# Patient Record
Sex: FEMALE | Race: OTHER | NOT HISPANIC OR LATINO
[De-identification: names, ages, dates, MRNs, and addresses within clinical notes are randomized per-mention and may not be internally consistent; named-entity substitution may affect disease eponyms.]

---

## 2023-10-19 PROBLEM — Z00.00 ENCOUNTER FOR PREVENTIVE HEALTH EXAMINATION: Status: ACTIVE | Noted: 2023-10-19

## 2023-10-21 ENCOUNTER — NON-APPOINTMENT (OUTPATIENT)
Age: 58
End: 2023-10-21

## 2023-11-01 ENCOUNTER — NON-APPOINTMENT (OUTPATIENT)
Age: 58
End: 2023-11-01

## 2023-11-03 ENCOUNTER — NON-APPOINTMENT (OUTPATIENT)
Age: 58
End: 2023-11-03

## 2023-11-06 ENCOUNTER — APPOINTMENT (OUTPATIENT)
Dept: BREAST CENTER | Facility: CLINIC | Age: 58
End: 2023-11-06
Payer: COMMERCIAL

## 2023-11-06 ENCOUNTER — NON-APPOINTMENT (OUTPATIENT)
Age: 58
End: 2023-11-06

## 2023-11-06 VITALS
BODY MASS INDEX: 26.8 KG/M2 | HEIGHT: 64 IN | DIASTOLIC BLOOD PRESSURE: 100 MMHG | HEART RATE: 106 BPM | WEIGHT: 157 LBS | SYSTOLIC BLOOD PRESSURE: 160 MMHG

## 2023-11-06 PROCEDURE — 36415 COLL VENOUS BLD VENIPUNCTURE: CPT

## 2023-11-06 PROCEDURE — 99205 OFFICE O/P NEW HI 60 MIN: CPT

## 2023-11-06 PROCEDURE — 93702 BIS XTRACELL FLUID ANALYSIS: CPT | Mod: NC

## 2023-11-06 RX ORDER — VALACYCLOVIR 500 MG/1
TABLET, FILM COATED ORAL
Refills: 0 | Status: ACTIVE | COMMUNITY

## 2023-11-07 ENCOUNTER — TRANSCRIPTION ENCOUNTER (OUTPATIENT)
Age: 58
End: 2023-11-07

## 2023-11-10 ENCOUNTER — TRANSCRIPTION ENCOUNTER (OUTPATIENT)
Age: 58
End: 2023-11-10

## 2023-11-13 ENCOUNTER — OUTPATIENT (OUTPATIENT)
Dept: OUTPATIENT SERVICES | Facility: HOSPITAL | Age: 58
LOS: 1 days | End: 2023-11-13
Payer: COMMERCIAL

## 2023-11-13 ENCOUNTER — RESULT REVIEW (OUTPATIENT)
Age: 58
End: 2023-11-13

## 2023-11-13 ENCOUNTER — TRANSCRIPTION ENCOUNTER (OUTPATIENT)
Age: 58
End: 2023-11-13

## 2023-11-13 DIAGNOSIS — C50.919 MALIGNANT NEOPLASM OF UNSPECIFIED SITE OF UNSPECIFIED FEMALE BREAST: ICD-10-CM

## 2023-11-13 LAB
SURGICAL PATHOLOGY STUDY: SIGNIFICANT CHANGE UP
SURGICAL PATHOLOGY STUDY: SIGNIFICANT CHANGE UP

## 2023-11-13 PROCEDURE — 88321 CONSLTJ&REPRT SLD PREP ELSWR: CPT

## 2023-11-15 ENCOUNTER — NON-APPOINTMENT (OUTPATIENT)
Age: 58
End: 2023-11-15

## 2023-11-27 ENCOUNTER — NON-APPOINTMENT (OUTPATIENT)
Age: 58
End: 2023-11-27

## 2023-12-12 ENCOUNTER — NON-APPOINTMENT (OUTPATIENT)
Age: 58
End: 2023-12-12

## 2023-12-18 ENCOUNTER — OUTPATIENT (OUTPATIENT)
Dept: OUTPATIENT SERVICES | Facility: HOSPITAL | Age: 58
LOS: 1 days | End: 2023-12-18
Payer: COMMERCIAL

## 2023-12-18 ENCOUNTER — RESULT REVIEW (OUTPATIENT)
Age: 58
End: 2023-12-18

## 2023-12-18 ENCOUNTER — APPOINTMENT (OUTPATIENT)
Dept: NUCLEAR MEDICINE | Facility: HOSPITAL | Age: 58
End: 2023-12-18

## 2023-12-18 PROCEDURE — 78195 LYMPH SYSTEM IMAGING: CPT | Mod: 26

## 2023-12-18 PROCEDURE — 78195 LYMPH SYSTEM IMAGING: CPT

## 2023-12-18 PROCEDURE — A9541: CPT

## 2023-12-19 ENCOUNTER — RESULT REVIEW (OUTPATIENT)
Age: 58
End: 2023-12-19

## 2023-12-19 ENCOUNTER — APPOINTMENT (OUTPATIENT)
Dept: BREAST CENTER | Facility: AMBULATORY SURGERY CENTER | Age: 58
End: 2023-12-19
Payer: COMMERCIAL

## 2023-12-19 PROCEDURE — 38525 BIOPSY/REMOVAL LYMPH NODES: CPT | Mod: LT

## 2023-12-19 PROCEDURE — 76098 X-RAY EXAM SURGICAL SPECIMEN: CPT | Mod: 26

## 2023-12-19 PROCEDURE — 19301 PARTIAL MASTECTOMY: CPT | Mod: LT

## 2023-12-28 ENCOUNTER — APPOINTMENT (OUTPATIENT)
Dept: HEMATOLOGY ONCOLOGY | Facility: CLINIC | Age: 58
End: 2023-12-28
Payer: COMMERCIAL

## 2023-12-28 ENCOUNTER — APPOINTMENT (OUTPATIENT)
Dept: BREAST CENTER | Facility: CLINIC | Age: 58
End: 2023-12-28
Payer: COMMERCIAL

## 2023-12-28 VITALS
BODY MASS INDEX: 25.61 KG/M2 | SYSTOLIC BLOOD PRESSURE: 127 MMHG | HEIGHT: 64 IN | DIASTOLIC BLOOD PRESSURE: 93 MMHG | HEART RATE: 92 BPM | WEIGHT: 150 LBS

## 2023-12-28 DIAGNOSIS — R92.8 OTHER ABNORMAL AND INCONCLUSIVE FINDINGS ON DIAGNOSTIC IMAGING OF BREAST: ICD-10-CM

## 2023-12-28 DIAGNOSIS — D24.1 BENIGN NEOPLASM OF RIGHT BREAST: ICD-10-CM

## 2023-12-28 PROCEDURE — 99024 POSTOP FOLLOW-UP VISIT: CPT

## 2023-12-28 PROCEDURE — 99203 OFFICE O/P NEW LOW 30 MIN: CPT

## 2023-12-28 NOTE — PHYSICAL EXAM
[Normocephalic] : normocephalic [EOMI] : extra ocular movement intact [Supple] : supple [No Supraclavicular Adenopathy] : no supraclavicular adenopathy [No Cervical Adenopathy] : no cervical adenopathy [de-identified] : *disregard breast image- unable to document correct incisons-breast image not working properly.  [de-identified] : R breast/axilla/supraclavicular area: No masses, discharge, or adenopathy [de-identified] : well healing surgical incisions

## 2023-12-28 NOTE — HISTORY OF PRESENT ILLNESS
[FreeTextEntry1] : Patient is a 59 yo F who presents today post-operative evaluation. S/P L nloc lumpectomy & SNB on 12/19/23 (Age 58). Final surgical pathology yielded 2.4 cm ILC, 0/4 LN negative for tumor, all final margins negative. L US bx proven ILC (ER+/NC+/HER2-) found on annual imaging as 0.5cm mass 11:00 4FN (was described as stable 0.3cm complex cyst 8/2022). She is an OLD/NEW with LOV in 2010. Hx of R benign US bx 6/28/13. She has hx of R excisional bx x2 in 12/29/2010 for FA 2:00 and FA w/ focal cellular stroma 4:00. Fhx of breast cancer in paternal aunt (age unknown). BRCA/full panel negative 2023. Patient denies palpable masses, skin changes, or nipple discharge bilaterally.  TNM Staging-pT2, pN0, pM0  8/16/21: B/l MG & US (LHR)- heterogeneously dense. R stable bx clip 12-1:00. R stable postsurgical change LIQ. US- R 0.6cm stable hypoechoic structure bx benign 12:00 7FN. BI-RADS 2 8/18/22: B/l MG & US- heterogeneously dense. R bx clip. R postsurgical scar LIQ. US- B/l multiple complex cysts. BI-RADS 2 9/8/23: B/l MG & US- heterogeneously dense. R bx clip 11-12:00. US- R 0.5cm bc mass 12:00 8FN. L 0.5cm hypoechoic mass w/ irregular margins 11:00 4FN (was 0.3cm in 2022- rec us bx). BI-RADS 4 10/13/23: L US bx 0.5cm mass 11:00 4FN (heart clip)- ILC, pleomorphic type. ER+ (>90%), [place]+ (>90%), HER2- (0 stain). Concordant- rec MRI 11/1/23: MRI- L 0.9cm irregular shaped mass w/ bx clip proven ILC 11:00. L 1cm clumped NME 2-3:00 (rec MR bx). R 0.9cm irregular mass 12-1:00 medial (rec MR bx). R 1.2cm region linear NME 12:00 anterior (rec MR bx). B/l no significant axillary or internal mammary lymphadenopathy. BI-RADS 4. 11/21/23: B/l MR bx x3 SITE 1) L NME 2-3:00 (hourglass clip)- Benign breast parenchyma w/ cystic apocrine metaplasia and UDH, microcalcs associated w/ epithelium. Benign & Concordant. SITE 2) R enhancing mass 12-1:00 (hourglass clip)- small focus LCIS (classic type), stromal fibrosis, UDH and apocrine metaplasia, microcalcs associated w/ LCIS and benign epithelium. High Risk & Concordant, there is a small focus, this may be an incidental lesion. Rec 6M f/u MRI if surgical excision is deferred. SITE 3) R NME 12:00 (buckle clip)- benign parenchyma w/ minute IDP, UDH and stromal fibrosis, microcalcs assoicated w/ epithelium. High risk due to presence of a papilloma; however papilloma is minute therefore rec 6M f/u. 12/19/23: L nloc lumpectomy & SNB- separate areas of ILC (pleomorphic type) ranging from microinvasive foci to largest present in 6 contiguous slices (24 mm), present at bx site changes HER2 negative (1+, 20%), LCIS, no lymphatic invasion identified, all final margins negative, 0/2 sentinel LN negative for tumor, 0/2 palpable LN negative for tumor.

## 2023-12-28 NOTE — PAST MEDICAL HISTORY
[Menarche Age ____] : age at menarche was [unfilled] [Menopause Age____] : age at menopause was [unfilled] [History of Hormone Replacement Treatment] : has a history of hormone replacement treatment [Total Preg ___] : G[unfilled] [Live Births ___] : P[unfilled]  [Age At Live Birth ___] : Age at live birth: [unfilled] [FreeTextEntry6] : No [FreeTextEntry7] : Yes [FreeTextEntry8] : Yes

## 2023-12-28 NOTE — HISTORY OF PRESENT ILLNESS
[FreeTextEntry1] : Patient is a 57yo F who presents today for initial evaluation of  L US bx proven ILC (ER+/SD+/HER2-) found on annual imaging as 0.5cm mass 11:00 4FN (was described as stable 0.3cm complex cyst 8/2022). She is an OLD/NEW with LOV in 2010. Hx of R benign US bx 6/28/13. She has hx of R excisional bx x2 in 12/29/2010 for FA 2:00 and FA w/ focal cellular stroma 4:00. Fhx of breast cancer in paternal aunt (age unknown). No genetic testing. Patient denies palpable masses, skin changes, or nipple discharge bilaterally.  8/16/21: B/l MG & US (LHR)- heterogeneously dense. R stable bx clip 12-1:00. R stable postsurgical change LIQ. US- R 0.6cm stable hypoechoic structure bx benign 12:00 7FN. BI-RADS 2 8/18/22: B/l MG & US- heterogeneously dense. R bx clip. R postsurgical scar LIQ. US- B/l multiple complex cysts. BI-RADS 2 9/8/23: B/l MG & US- heterogeneously dense. R bx clip 11-12:00. US- R 0.5cm bc mass 12:00 8FN. L 0.5cm hypoechoic mass w/ irregular margins 11:00 4FN (was 0.3cm in 2022- rec us bx). BI-RADS 4 10/13/23: L US bx 0.5cm mass 11:00 4FN (heart clip)- ILC, pleomorphic type. ER+ (>90%), SD+ (>90%), HER2- (0 stain). Concordant- rec MRI 11/1/23: MRI- L 0.9cm irregular shaped mass w/ bx clip proven ILC 11:00. L 1cm clumped NME 2-3:00 (rec MR bx). R 0.9cm irregular mass 12-1:00 medial (rec MR bx). R 1.2cm region linear NME 12:00 anterior (rec MR bx). B/l no significant axillary or internal mammary lymphadenopathy. BI-RADS 4.

## 2024-01-08 ENCOUNTER — TRANSCRIPTION ENCOUNTER (OUTPATIENT)
Age: 59
End: 2024-01-08

## 2024-01-12 ENCOUNTER — NON-APPOINTMENT (OUTPATIENT)
Age: 59
End: 2024-01-12

## 2024-01-16 ENCOUNTER — APPOINTMENT (OUTPATIENT)
Dept: RADIATION ONCOLOGY | Facility: CLINIC | Age: 59
End: 2024-01-16
Payer: COMMERCIAL

## 2024-01-16 VITALS
OXYGEN SATURATION: 98 % | HEART RATE: 79 BPM | DIASTOLIC BLOOD PRESSURE: 93 MMHG | RESPIRATION RATE: 16 BRPM | SYSTOLIC BLOOD PRESSURE: 157 MMHG | TEMPERATURE: 98 F

## 2024-01-16 PROCEDURE — 99204 OFFICE O/P NEW MOD 45 MIN: CPT

## 2024-01-16 NOTE — VITALS
[Maximal Pain Intensity: 0/10] : 0/10 [Least Pain Intensity: 0/10] : 0/10 [90: Able to carry normal activity; minor signs or symptoms of disease.] : 90: Able to carry normal activity; minor signs or symptoms of disease.  [90: Minor restrictions in physically strenous activity.] : 90: Minor restrictions in physically strenuous activity. [ECOG Performance Status: 1 - Restricted in physically strenuous activity but ambulatory and able to carry out work of a light or sedentary nature] : Performance Status: 1 - Restricted in physically strenuous activity but ambulatory and able to carry out work of a light or sedentary nature, e.g., light house work, office work [Date: ____________] : Patient's last distress assessment performed on [unfilled]. [3 - Distress Level] : Distress Level: 3

## 2024-01-16 NOTE — HISTORY OF PRESENT ILLNESS
[FreeTextEntry1] : Mouna Gandara is a 59yo F who presents today for consideration of radiation therapy in the management of  LEFT invasive lobular carcinoma (ER+/[place]+/HER2-) found on annual imaging as 0.5cm mass 11:00 4FN (was described as stable 0.3cm complex cyst 2022). Patient referred by Dr. Ailyn Salazar. Patient was seen by Dr. Obregon 23 with plan for endocrine therapy. She has hx of R excisional bx x2 in 2010 for FA 2:00 and FA w/ focal cellular stroma 4:00. Fhx of breast cancer in paternal aunt (age unknown).   HPI is as follows:  21: B/l MG & US (LHR)- heterogeneously dense. R stable bx clip 12-1:00. R stable postsurgical change LIQ. US- R 0.6cm stable hypoechoic structure bx benign 12:00 7FN. BI-RADS 2 22: B/l MG & US- heterogeneously dense. R bx clip. R postsurgical scar LIQ. US- B/l multiple complex cysts. BI-RADS 2 23: B/l MG & US- heterogeneously dense. R bx clip 11-12:00. US- R 0.5cm bc mass 12:00 8FN. L 0.5cm hypoechoic mass w/ irregular margins 11:00 4FN (was 0.3cm in - rec us bx). BI-RADS 4 10/13/23: L US bx 0.5cm mass 11:00 4FN (heart clip)- ILC, pleomorphic type. ER+ (>90%), [place]+ (>90%), HER2- (0 stain). Concordant- rec MRI 23: MRI- L 0.9cm irregular shaped mass w/ bx clip proven ILC 11:00. L 1cm clumped NME 2-3:00 (rec MR bx). R 0.9cm irregular mass 12-1:00 medial (rec MR bx). R 1.2cm region linear NME 12:00 anterior (rec MR bx). B/l no significant axillary or internal mammary lymphadenopathy. BI-RADS 4. 23: Pathology Synoptic Summary 1: Breast Invasive Carcinoma - Resection Specimen Procedure:  Excision (less than total mastectomy) Specimen Laterality:   Left Tumor Histologic Type:   Invasive lobular carcinoma Histologic Grade ([place] Histologic Score): Glandular (Acinar) / Tubular Differentiation:    Score 3 Nuclear Pleomorphism:   Score 3 Mitotic Rate:   Score 2 Overall Grade:   Grade 3 (scores of 8 or 9) Tumor Size:  24 Millimeters (mm) Ductal Carcinoma In Situ (DCIS):    Not identified Lymphatic and / or Vascular Invasion:    Not identified Treatment Effect in the Breast:   No known presurgical therapy Margins Margin Status for Invasive Carcinoma:    All margins negative for invasive carcinoma Distance from Invasive Carcinoma to Closest Margin:    Greater than 11 mm Regional Lymph Nodes Regional Lymph Node Status:   All regional lymph nodes negative for tumor Total Number of Lymph Nodes Examined (sentinel and non-sentinel): 8 Number of [place] Nodes Examined:    4 pTNM Classification (AJCC 8th Edition) pT Category:   pT2 T Suffix:  (m) pN Category:   pN0  Final Diagnosis 1  Left breast, lumpectomy: -   Separate areas of infiltrating lobular carcinoma, pleomorphic type, ranging from microinvasive foci to the largest present in 6 contiguous slices (24 mm), present at biopsy site changes.  See HER2 IHC report below -   Lobular carcinoma in situ. -   The closest margin is inferior and is 4 mm away (for final margin see part 5) E. -   No lymphatic invasion identified.  Oncotype RS 12   24 - RT Consult - Today patient presents for consultation accompanied by her daughter. Patient states she is feeling well overall but does note some stiffness to LUE, she is starting remote therapy to review exercises this thursday. 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    Prior RT - no  Prior Chemo - no Pacemaker - no

## 2024-02-06 ENCOUNTER — NON-APPOINTMENT (OUTPATIENT)
Age: 59
End: 2024-02-06

## 2024-02-06 NOTE — HISTORY OF PRESENT ILLNESS
[FreeTextEntry1] : Mouna Gandara is a 57yo F who presents today for consideration of radiation therapy in the management of  LEFT invasive lobular carcinoma (ER+/MA+/HER2-) found on annual imaging as 0.5cm mass 11:00 4FN (was described as stable 0.3cm complex cyst 2022). Patient referred by Dr. Ailyn Salazar. Patient was seen by Dr. Obregon 23 with plan for endocrine therapy. She has hx of R excisional bx x2 in 2010 for FA 2:00 and FA w/ focal cellular stroma 4:00. Fhx of breast cancer in paternal aunt (age unknown).   24 - OTV - Patient has completed 530/4240cGy of radiation to left breast. Patient states she is doing well overall. Denies pain, fatigue, skin irritation. Patient to start using mometasone. Continue radiation.   ----------------------------------- HPI is as follows:  21: B/l MG & US (R)- heterogeneously dense. R stable bx clip 12-1:00. R stable postsurgical change LIQ. US- R 0.6cm stable hypoechoic structure bx benign 12:00 7FN. BI-RADS 2 22: B/l MG & US- heterogeneously dense. R bx clip. R postsurgical scar LIQ. US- B/l multiple complex cysts. BI-RADS 2 23: B/l MG & US- heterogeneously dense. R bx clip 11-12:00. US- R 0.5cm bc mass 12:00 8FN. L 0.5cm hypoechoic mass w/ irregular margins 11:00 4FN (was 0.3cm in - rec us bx). BI-RADS 4 10/13/23: L US bx 0.5cm mass 11:00 4FN (heart clip)- ILC, pleomorphic type. ER+ (>90%), MA+ (>90%), HER2- (0 stain). Concordant- rec MRI 23: MRI- L 0.9cm irregular shaped mass w/ bx clip proven ILC 11:00. L 1cm clumped NME 2-3:00 (rec MR bx). R 0.9cm irregular mass 12-1:00 medial (rec MR bx). R 1.2cm region linear NME 12:00 anterior (rec MR bx). B/l no significant axillary or internal mammary lymphadenopathy. BI-RADS 4. 23: Pathology Synoptic Summary 1: Breast Invasive Carcinoma - Resection Specimen Procedure:  Excision (less than total mastectomy) Specimen Laterality:   Left Tumor Histologic Type:   Invasive lobular carcinoma Histologic Grade ([place] Histologic Score): Glandular (Acinar) / Tubular Differentiation:    Score 3 Nuclear Pleomorphism:   Score 3 Mitotic Rate:   Score 2 Overall Grade:   Grade 3 (scores of 8 or 9) Tumor Size:  24 Millimeters (mm) Ductal Carcinoma In Situ (DCIS):    Not identified Lymphatic and / or Vascular Invasion:    Not identified Treatment Effect in the Breast:   No known presurgical therapy Margins Margin Status for Invasive Carcinoma:    All margins negative for invasive carcinoma Distance from Invasive Carcinoma to Closest Margin:    Greater than 11 mm Regional Lymph Nodes Regional Lymph Node Status:   All regional lymph nodes negative for tumor Total Number of Lymph Nodes Examined (sentinel and non-sentinel): 8 Number of [place] Nodes Examined:    4 pTNM Classification (AJCC 8th Edition) pT Category:   pT2 T Suffix:  (m) pN Category:   pN0  Final Diagnosis 1  Left breast, lumpectomy: -   Separate areas of infiltrating lobular carcinoma, pleomorphic type, ranging from microinvasive foci to the largest present in 6 contiguous slices (24 mm), present at biopsy site changes.  See HER2 IHC report below -   Lobular carcinoma in situ. -   The closest margin is inferior and is 4 mm away (for final margin see part 5) E. -   No lymphatic invasion identified.  Oncotype RS 12   24 - RT Consult - Today patient presents for consultation accompanied by her daughter. Patient states she is feeling well overall but does note some stiffness to LUE, she is starting remote therapy to review exercises this thursday. 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    Prior RT - no  Prior Chemo - no Pacemaker - no

## 2024-02-06 NOTE — REVIEW OF SYSTEMS
[Nausea: Grade 0] : Nausea: Grade 0 [Fatigue: Grade 0] : Fatigue: Grade 0 [Breast Pain: Grade 0] : Breast Pain: Grade 0 [Anxiety: Grade 0] : Anxiety: Grade 0  [Depression: Grade 0] : Depression: Grade 0 [Insomnia: Grade 0] : Insomnia: Grade 0 [Dermatitis Radiation: Grade 0] : Dermatitis Radiation: Grade 0

## 2024-02-06 NOTE — DISEASE MANAGEMENT
[Pathological] : TNM Stage: p [TTNM] : 2 [NTNM] : 0 [MTNM] : x [IA] : IA [de-identified] : 045 [[place] [id number]] : [id number] [de-identified] : left breast

## 2024-02-12 ENCOUNTER — NON-APPOINTMENT (OUTPATIENT)
Age: 59
End: 2024-02-12

## 2024-02-13 ENCOUNTER — NON-APPOINTMENT (OUTPATIENT)
Age: 59
End: 2024-02-13

## 2024-02-13 VITALS
RESPIRATION RATE: 16 BRPM | HEART RATE: 72 BPM | OXYGEN SATURATION: 100 % | SYSTOLIC BLOOD PRESSURE: 130 MMHG | TEMPERATURE: 97.2 F | DIASTOLIC BLOOD PRESSURE: 90 MMHG

## 2024-02-13 NOTE — DISEASE MANAGEMENT
[Pathological] : TNM Stage: p [IA] : IA [TTNM] : 2 [NTNM] : 0 [MTNM] : x [de-identified] : [id number] [de-identified] : 0077 [de-identified] : left breast

## 2024-02-13 NOTE — HISTORY OF PRESENT ILLNESS
[FreeTextEntry1] : Mouna Gandara is a 57yo F who presents today for consideration of radiation therapy in the management of  LEFT invasive lobular carcinoma (ER+/IL+/HER2-) found on annual imaging as 0.5cm mass 11:00 4FN (was described as stable 0.3cm complex cyst 2022). Patient referred by Dr. Ailyn Salazar. Patient was seen by Dr. Obregon 23 with plan for endocrine therapy. She has hx of R excisional bx x2 in 2010 for FA 2:00 and FA w/ focal cellular stroma 4:00. Fhx of breast cancer in paternal aunt (age unknown).  24 - OTV - Patient has completed 1855/5240cGy of radiation to left Breast. Patient presents today with no complaints or concerns. She is tolerating radiation without incident. She feels well, travelling to and from radiation is tiring her (she has been in touch with  and is trying to get access a ride). Otherwise, she does not complain of fatigue. She is using mometasone and Aquaphor daily, her skin is intact with no irritation. She reports no pain, SOB/PIEDAD/CP/PALP. Continue radiation.  24 - OTV - Patient has completed 530/4240cGy of radiation to left breast. Patient states she is doing well overall. Denies pain, fatigue, skin irritation. Patient to start using mometasone. Continue radiation.   ----------------------------------- HPI is as follows:  21: B/l MG & US (LHR)- heterogeneously dense. R stable bx clip 12-1:00. R stable postsurgical change LIQ. US- R 0.6cm stable hypoechoic structure bx benign 12:00 7FN. BI-RADS 2 22: B/l MG & US- heterogeneously dense. R bx clip. R postsurgical scar LIQ. US- B/l multiple complex cysts. BI-RADS 2 23: B/l MG & US- heterogeneously dense. R bx clip 11-12:00. US- R 0.5cm bc mass 12:00 8FN. L 0.5cm hypoechoic mass w/ irregular margins 11:00 4FN (was 0.3cm in - rec us bx). BI-RADS 4 10/13/23: L US bx 0.5cm mass 11:00 4FN (heart clip)- ILC, pleomorphic type. ER+ (>90%), IL+ (>90%), HER2- (0 stain). Concordant- rec MRI 23: MRI- L 0.9cm irregular shaped mass w/ bx clip proven ILC 11:00. L 1cm clumped NME 2-3:00 (rec MR bx). R 0.9cm irregular mass 12-1:00 medial (rec MR bx). R 1.2cm region linear NME 12:00 anterior (rec MR bx). B/l no significant axillary or internal mammary lymphadenopathy. BI-RADS 4. 23: Pathology Synoptic Summary 1: Breast Invasive Carcinoma - Resection Specimen Procedure:  Excision (less than total mastectomy) Specimen Laterality:   Left Tumor Histologic Type:   Invasive lobular carcinoma Histologic Grade ([place] Histologic Score): Glandular (Acinar) / Tubular Differentiation:    Score 3 Nuclear Pleomorphism:   Score 3 Mitotic Rate:   Score 2 Overall Grade:   Grade 3 (scores of 8 or 9) Tumor Size:  24 Millimeters (mm) Ductal Carcinoma In Situ (DCIS):    Not identified Lymphatic and / or Vascular Invasion:    Not identified Treatment Effect in the Breast:   No known presurgical therapy Margins Margin Status for Invasive Carcinoma:    All margins negative for invasive carcinoma Distance from Invasive Carcinoma to Closest Margin:    Greater than 11 mm Regional Lymph Nodes Regional Lymph Node Status:   All regional lymph nodes negative for tumor Total Number of Lymph Nodes Examined (sentinel and non-sentinel): 8 Number of [place] Nodes Examined:    4 pTNM Classification (AJCC 8th Edition) pT Category:   pT2 T Suffix:  (m) pN Category:   pN0  Final Diagnosis 1  Left breast, lumpectomy: -   Separate areas of infiltrating lobular carcinoma, pleomorphic type, ranging from microinvasive foci to the largest present in 6 contiguous slices (24 mm), present at biopsy site changes.  See HER2 IHC report below -   Lobular carcinoma in situ. -   The closest margin is inferior and is 4 mm away (for final margin see part 5) E. -   No lymphatic invasion identified.  Oncotype RS 12   1/16/24 - RT Consult - Today patient presents for consultation accompanied by her daughter. Patient states she is feeling well overall but does note some stiffness to LUE, she is starting remote therapy to review exercises this thursday. 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    Prior RT - no  Prior Chemo - no Pacemaker - no

## 2024-02-13 NOTE — REVIEW OF SYSTEMS
[Fatigue: Grade 0] : Fatigue: Grade 0 [Breast Pain: Grade 0] : Breast Pain: Grade 0 [Skin Atrophy: Grade 0] : Skin Atrophy: Grade 0 [Skin Hyperpigmentation: Grade 1 - Hyperpigmentation covering <10% BSA; no psychosocial impact] : Skin Hyperpigmentation: Grade 1 - Hyperpigmentation covering <10% BSA; no psychosocial impact

## 2024-02-13 NOTE — REASON FOR VISIT
[Consideration of Curative Therapy] : consideration of curative therapy for [Breast Cancer] : breast cancer [Routine On-Treatment] : a routine on-treatment visit for

## 2024-02-20 ENCOUNTER — NON-APPOINTMENT (OUTPATIENT)
Age: 59
End: 2024-02-20

## 2024-02-20 NOTE — HISTORY OF PRESENT ILLNESS
[FreeTextEntry1] : Mouna Gandara is a 57yo F who presents today for consideration of radiation therapy in the management of  LEFT invasive lobular carcinoma (ER+/[place]+/HER2-) found on annual imaging as 0.5cm mass 11:00 4FN (was described as stable 0.3cm complex cyst 2022). Patient referred by Dr. Ailyn Salazar. Patient was seen by Dr. Obregon 23 with plan for endocrine therapy. She has hx of R excisional bx x2 in 2010 for FA 2:00 and FA w/ focal cellular stroma 4:00. Fhx of breast cancer in paternal aunt (age unknown).  24 - OTV - Patient has completed 2385cGy/5240cGy of radiation to left Breast. Patient presents today generally well with no complaints of fever, sob, chest pain, nipple discharge, dimpling or discoloration. Patient continues to adhere to skin regimen with mometasone and Aquaphor BID. Patient to continue RT.   24 - OTV - Patient has completed 1855/5240cGy of radiation to left Breast. Patient presents today with no complaints or concerns. She is tolerating radiation without incident. She feels well, travelling to and from radiation is tiring her (she has been in touch with  and is trying to get access a ride). Otherwise, she does not complain of fatigue. She is using mometasone and Aquaphor daily, her skin is intact with no irritation. She reports no pain, SOB/PIEDAD/CP/PALP. Continue radiation.  24 - OTV - Patient has completed 530/4240cGy of radiation to left breast. Patient states she is doing well overall. Denies pain, fatigue, skin irritation. Patient to start using mometasone. Continue radiation.   ----------------------------------- HPI is as follows:  21: B/l MG & US (LHR)- heterogeneously dense. R stable bx clip 12-1:00. R stable postsurgical change LIQ. US- R 0.6cm stable hypoechoic structure bx benign 12:00 7FN. BI-RADS 2 22: B/l MG & US- heterogeneously dense. R bx clip. R postsurgical scar LIQ. US- B/l multiple complex cysts. BI-RADS 2 23: B/l MG & US- heterogeneously dense. R bx clip 11-12:00. US- R 0.5cm bc mass 12:00 8FN. L 0.5cm hypoechoic mass w/ irregular margins 11:00 4FN (was 0.3cm in - rec us bx). BI-RADS 4 10/13/23: L US bx 0.5cm mass 11:00 4FN (heart clip)- ILC, pleomorphic type. ER+ (>90%), [place]+ (>90%), HER2- (0 stain). Concordant- rec MRI 23: MRI- L 0.9cm irregular shaped mass w/ bx clip proven ILC 11:00. L 1cm clumped NME 2-3:00 (rec MR bx). R 0.9cm irregular mass 12-1:00 medial (rec MR bx). R 1.2cm region linear NME 12:00 anterior (rec MR bx). B/l no significant axillary or internal mammary lymphadenopathy. BI-RADS 4. 23: Pathology Synoptic Summary 1: Breast Invasive Carcinoma - Resection Specimen Procedure:  Excision (less than total mastectomy) Specimen Laterality:   Left Tumor Histologic Type:   Invasive lobular carcinoma Histologic Grade ([place] Histologic Score): Glandular (Acinar) / Tubular Differentiation:    Score 3 Nuclear Pleomorphism:   Score 3 Mitotic Rate:   Score 2 Overall Grade:   Grade 3 (scores of 8 or 9) Tumor Size:  24 Millimeters (mm) Ductal Carcinoma In Situ (DCIS):    Not identified Lymphatic and / or Vascular Invasion:    Not identified Treatment Effect in the Breast:   No known presurgical therapy Margins Margin Status for Invasive Carcinoma:    All margins negative for invasive carcinoma Distance from Invasive Carcinoma to Closest Margin:    Greater than 11 mm Regional Lymph Nodes Regional Lymph Node Status:   All regional lymph nodes negative for tumor Total Number of Lymph Nodes Examined (sentinel and non-sentinel): 8 Number of [place] Nodes Examined:    4 pTNM Classification (AJCC 8th Edition) pT Category:   pT2 T Suffix:  (m) pN Category:   pN0  Final Diagnosis 1  Left breast, lumpectomy: -   Separate areas of infiltrating lobular carcinoma, pleomorphic type, ranging from microinvasive foci to the largest present in 6 contiguous slices (24 mm), present at biopsy site changes.  See HER2 IHC report below -   Lobular carcinoma in situ. -   The closest margin is inferior and is 4 mm away (for final margin see part 5) E. -   No lymphatic invasion identified.  Oncotype RS 12   24 - RT Consult - Today patient presents for consultation accompanied by her daughter. Patient states she is feeling well overall but does note some stiffness to LUE, she is starting remote therapy to review exercises this thursday. 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    Prior RT - no  Prior Chemo - no Pacemaker - no

## 2024-02-20 NOTE — DISEASE MANAGEMENT
[Pathological] : TNM Stage: p [IA] : IA [TTNM] : 2 [NTNM] : 0 [MTNM] : x [de-identified] : [id number] [de-identified] : 7912 [de-identified] : left breast

## 2024-02-27 ENCOUNTER — NON-APPOINTMENT (OUTPATIENT)
Age: 59
End: 2024-02-27

## 2024-02-27 VITALS
RESPIRATION RATE: 15 BRPM | HEART RATE: 86 BPM | SYSTOLIC BLOOD PRESSURE: 126 MMHG | TEMPERATURE: 97.3 F | OXYGEN SATURATION: 97 % | DIASTOLIC BLOOD PRESSURE: 89 MMHG

## 2024-02-27 NOTE — DISEASE MANAGEMENT
[Pathological] : TNM Stage: p [IA] : IA [TTNM] : 2 [MTNM] : x [NTNM] : 0 [de-identified] : [id number] [de-identified] : left breast [de-identified] : 3213

## 2024-02-27 NOTE — REVIEW OF SYSTEMS
[Constipation: Grade 0] : Constipation: Grade 0 [Diarrhea: Grade 0] : Diarrhea: Grade 0 [Nausea: Grade 0] : Nausea: Grade 0 [Vomiting: Grade 0] : Vomiting: Grade 0 [Anxiety: Grade 0] : Anxiety: Grade 0  [Fatigue: Grade 0] : Fatigue: Grade 0 [Cough: Grade 0] : Cough: Grade 0 [Depression: Grade 0] : Depression: Grade 0 [Dyspnea: Grade 0] : Dyspnea: Grade 0 [Skin Hyperpigmentation: Grade 1 - Hyperpigmentation covering <10% BSA; no psychosocial impact] : Skin Hyperpigmentation: Grade 1 - Hyperpigmentation covering <10% BSA; no psychosocial impact [Dermatitis Radiation: Grade 0] : Dermatitis Radiation: Grade 0 [FreeTextEntry4] : patient states slight hyperpigmentation on L breast.

## 2024-02-27 NOTE — HISTORY OF PRESENT ILLNESS
[FreeTextEntry1] : Mouna Gandara is a 57yo F who presents today for consideration of radiation therapy in the management of  LEFT invasive lobular carcinoma (ER+/GA+/HER2-) found on annual imaging as 0.5cm mass 11:00 4FN (was described as stable 0.3cm complex cyst 2022). Patient referred by Dr. Ailyn Salazar. Patient was seen by Dr. Obregon 23 with plan for endocrine therapy. She has hx of R excisional bx x2 in 2010 for FA 2:00 and FA w/ focal cellular stroma 4:00. Fhx of breast cancer in paternal aunt (age unknown).  24 - OTV - Patient has completed 3975cGy/5240cGy of radiation to left Breast. Patient presents today feeling well overall. She stated she did have some L shoulder pain which she took advil, tylenol and changed her bra to a softer looser sports bra which helped relieve the pain. Today she has no complaint of pain in the L side. She does notice some nipple sensitivity which is not too bothersome. Otherwise she has no complaint of SOB/PIEDAD, CP/Palp. Patient to continue RT   24 - OTV - Patient has completed 2385cGy/5240cGy of radiation to left Breast. Patient presents today generally well with no complaints of fever, sob, chest pain, nipple discharge, dimpling or discoloration. Patient continues to adhere to skin regimen with mometasone and Aquaphor BID. Patient to continue RT.   24 - OTV - Patient has completed 1855/5240cGy of radiation to left Breast. Patient presents today with no complaints or concerns. She is tolerating radiation without incident. She feels well, travelling to and from radiation is tiring her (she has been in touch with  and is trying to get access a ride). Otherwise, she does not complain of fatigue. She is using mometasone and Aquaphor daily, her skin is intact with no irritation. She reports no pain, SOB/PIEDAD/CP/PALP. Continue radiation.  24 - OTV - Patient has completed 530/4240cGy of radiation to left breast. Patient states she is doing well overall. Denies pain, fatigue, skin irritation. Patient to start using mometasone. Continue radiation.   ----------------------------------- HPI is as follows:  21: B/l MG & US (LHR)- heterogeneously dense. R stable bx clip 12-1:00. R stable postsurgical change LIQ. US- R 0.6cm stable hypoechoic structure bx benign 12:00 7FN. BI-RADS 2 22: B/l MG & US- heterogeneously dense. R bx clip. R postsurgical scar LIQ. US- B/l multiple complex cysts. BI-RADS 2 23: B/l MG & US- heterogeneously dense. R bx clip 11-12:00. US- R 0.5cm bc mass 12:00 8FN. L 0.5cm hypoechoic mass w/ irregular margins 11:00 4FN (was 0.3cm in - rec us bx). BI-RADS 4 10/13/23: L US bx 0.5cm mass 11:00 4FN (heart clip)- ILC, pleomorphic type. ER+ (>90%), GA+ (>90%), HER2- (0 stain). Concordant- rec MRI 23: MRI- L 0.9cm irregular shaped mass w/ bx clip proven ILC 11:00. L 1cm clumped NME 2-3:00 (rec MR bx). R 0.9cm irregular mass 12-1:00 medial (rec MR bx). R 1.2cm region linear NME 12:00 anterior (rec MR bx). B/l no significant axillary or internal mammary lymphadenopathy. BI-RADS 4. 23: Pathology Synoptic Summary 1: Breast Invasive Carcinoma - Resection Specimen Procedure:  Excision (less than total mastectomy) Specimen Laterality:   Left Tumor Histologic Type:   Invasive lobular carcinoma Histologic Grade ([place] Histologic Score): Glandular (Acinar) / Tubular Differentiation:    Score 3 Nuclear Pleomorphism:   Score 3 Mitotic Rate:   Score 2 Overall Grade:   Grade 3 (scores of 8 or 9) Tumor Size:  24 Millimeters (mm) Ductal Carcinoma In Situ (DCIS):    Not identified Lymphatic and / or Vascular Invasion:    Not identified Treatment Effect in the Breast:   No known presurgical therapy Margins Margin Status for Invasive Carcinoma:    All margins negative for invasive carcinoma Distance from Invasive Carcinoma to Closest Margin:    Greater than 11 mm Regional Lymph Nodes Regional Lymph Node Status:   All regional lymph nodes negative for tumor Total Number of Lymph Nodes Examined (sentinel and non-sentinel): 8 Number of [place] Nodes Examined:    4 pTNM Classification (AJCC 8th Edition) pT Category:   pT2 T Suffix:  (m) pN Category:   pN0  Final Diagnosis 1  Left breast, lumpectomy: -   Separate areas of infiltrating lobular carcinoma, pleomorphic type, ranging from microinvasive foci to the largest present in 6 contiguous slices (24 mm), present at biopsy site changes.  See HER2 IHC report below -   Lobular carcinoma in situ. -   The closest margin is inferior and is 4 mm away (for final margin see part 5) E. -   No lymphatic invasion identified.  Oncotype RS 12   24 - RT Consult - Today patient presents for consultation accompanied by her daughter. Patient states she is feeling well overall but does note some stiffness to LUE, she is starting remote therapy to review exercises this thursday. 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    Prior RT - no  Prior Chemo - no Pacemaker - no

## 2024-02-27 NOTE — VITALS
[Maximal Pain Intensity: 0/10] : 0/10 [Least Pain Intensity: 0/10] : 0/10 [100: Normal, no complaints, no evidence of disease.] : 100: Normal, no complaints, no evidence of disease. [NoTreatment Scheduled] : no treatment scheduled [ECOG Performance Status: 0 - Fully active, able to carry on all pre-disease performance without restriction] : Performance Status: 0 - Fully active, able to carry on all pre-disease performance without restriction

## 2024-02-28 ENCOUNTER — TRANSCRIPTION ENCOUNTER (OUTPATIENT)
Age: 59
End: 2024-02-28

## 2024-03-05 NOTE — REVIEW OF SYSTEMS
[Constipation: Grade 0] : Constipation: Grade 0 [Diarrhea: Grade 0] : Diarrhea: Grade 0 [Nausea: Grade 0] : Nausea: Grade 0 [Vomiting: Grade 0] : Vomiting: Grade 0 [Fatigue: Grade 1 - Fatigue relieved by rest] : Fatigue: Grade 1 - Fatigue relieved by rest [Skin Hyperpigmentation: Grade 0] : Skin Hyperpigmentation: Grade 0 [Dermatitis Radiation: Grade 0] : Dermatitis Radiation: Grade 0

## 2024-03-05 NOTE — DISEASE MANAGEMENT
[Pathological] : TNM Stage: p [TTNM] : 2 [NTNM] : 0 [MTNM] : x [IA] : IA [de-identified] : 2803 [de-identified] : 4804 [de-identified] : left breast

## 2024-03-05 NOTE — HISTORY OF PRESENT ILLNESS
[FreeTextEntry1] : Mouna Gandara is a 59yo F who presents today for consideration of radiation therapy in the management of LEFT invasive lobular carcinoma (ER+/HI+/HER2-) found on annual imaging as 0.5cm mass 11:00 4FN (was described as stable 0.3cm complex cyst 2022). Patient referred by Dr. Ailyn Salazar. Patient was seen by Dr. Obregon 23 with plan for endocrine therapy. She has hx of R excisional bx x2 in 2010 for FA 2:00 and FA w/ focal cellular stroma 4:00. Fhx of breast cancer in paternal aunt (age unknown).  3/5/2024 - OTV - Patient has completed 5240/5240cGy of radiation to the left breast. She comes today with no complaint of pain, darkness, itching or burning. She has no nausea or vomiting. Overall she is feeling well. She was given her post treatment patient education, has her PTE appointment for 2024. She will see Dr Obregon on 3/13/2024, and Dr Ailyn Salazar 2024.  24 - OTV - Patient has completed 3975cGy/5240cGy of radiation to left Breast. Patient presents today feeling well overall. She stated she did have some L shoulder pain which she took advil, tylenol and changed her bra to a softer looser sports bra which helped relieve the pain. Today she has no complaint of pain in the L side. She does notice some nipple sensitivity which is not too bothersome. Otherwise she has no complaint of SOB/PIEDAD, CP/Palp. Patient to continue RT   24 - OTV - Patient has completed 2385cGy/5240cGy of radiation to left Breast. Patient presents today generally well with no complaints of fever, sob, chest pain, nipple discharge, dimpling or discoloration. Patient continues to adhere to skin regimen with mometasone and Aquaphor BID. Patient to continue RT.   24 - OTV - Patient has completed 1855/5240cGy of radiation to left Breast. Patient presents today with no complaints or concerns. She is tolerating radiation without incident. She feels well, travelling to and from radiation is tiring her (she has been in touch with  and is trying to get access a ride). Otherwise, she does not complain of fatigue. She is using mometasone and Aquaphor daily, her skin is intact with no irritation. She reports no pain, SOB/PIEDAD/CP/PALP. Continue radiation.  24 - OTV - Patient has completed 530/4240cGy of radiation to left breast. Patient states she is doing well overall. Denies pain, fatigue, skin irritation. Patient to start using mometasone. Continue radiation.   ----------------------------------- HPI is as follows:  21: B/l MG & US (LHR)- heterogeneously dense. R stable bx clip 12-1:00. R stable postsurgical change LIQ. US- R 0.6cm stable hypoechoic structure bx benign 12:00 7FN. BI-RADS 2 22: B/l MG & US- heterogeneously dense. R bx clip. R postsurgical scar LIQ. US- B/l multiple complex cysts. BI-RADS 2 23: B/l MG & US- heterogeneously dense. R bx clip 11-12:00. US- R 0.5cm bc mass 12:00 8FN. L 0.5cm hypoechoic mass w/ irregular margins 11:00 4FN (was 0.3cm in - rec us bx). BI-RADS 4 10/13/23: L US bx 0.5cm mass 11:00 4FN (heart clip)- ILC, pleomorphic type. ER+ (>90%), HI+ (>90%), HER2- (0 stain). Concordant- rec MRI 23: MRI- L 0.9cm irregular shaped mass w/ bx clip proven ILC 11:00. L 1cm clumped NME 2-3:00 (rec MR bx). R 0.9cm irregular mass 12-1:00 medial (rec MR bx). R 1.2cm region linear NME 12:00 anterior (rec MR bx). B/l no significant axillary or internal mammary lymphadenopathy. BI-RADS 4. 23: Pathology Synoptic Summary 1: Breast Invasive Carcinoma - Resection Specimen Procedure:  Excision (less than total mastectomy) Specimen Laterality:   Left Tumor Histologic Type:   Invasive lobular carcinoma Histologic Grade ([place] Histologic Score): Glandular (Acinar) / Tubular Differentiation:    Score 3 Nuclear Pleomorphism:   Score 3 Mitotic Rate:   Score 2 Overall Grade:   Grade 3 (scores of 8 or 9) Tumor Size:  24 Millimeters (mm) Ductal Carcinoma In Situ (DCIS):    Not identified Lymphatic and / or Vascular Invasion:    Not identified Treatment Effect in the Breast:   No known presurgical therapy Margins Margin Status for Invasive Carcinoma:    All margins negative for invasive carcinoma Distance from Invasive Carcinoma to Closest Margin:    Greater than 11 mm Regional Lymph Nodes Regional Lymph Node Status:   All regional lymph nodes negative for tumor Total Number of Lymph Nodes Examined (sentinel and non-sentinel): 8 Number of [place] Nodes Examined:    4 pTNM Classification (AJCC 8th Edition) pT Category:   pT2 T Suffix:  (m) pN Category:   pN0  Final Diagnosis 1  Left breast, lumpectomy: -   Separate areas of infiltrating lobular carcinoma, pleomorphic type, ranging from microinvasive foci to the largest present in 6 contiguous slices (24 mm), present at biopsy site changes.  See HER2 IHC report below -   Lobular carcinoma in situ. -   The closest margin is inferior and is 4 mm away (for final margin see part 5) E. -   No lymphatic invasion identified.  Oncotype RS 12   24 - RT Consult - Today patient presents for consultation accompanied by her daughter. Patient states she is feeling well overall but does note some stiffness to LUE, she is starting remote therapy to review exercises this thursday. 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    Prior RT - no  Prior Chemo - no Pacemaker - no

## 2024-03-14 ENCOUNTER — APPOINTMENT (OUTPATIENT)
Dept: HEMATOLOGY ONCOLOGY | Facility: CLINIC | Age: 59
End: 2024-03-14
Payer: COMMERCIAL

## 2024-03-14 VITALS
RESPIRATION RATE: 18 BRPM | DIASTOLIC BLOOD PRESSURE: 90 MMHG | TEMPERATURE: 97 F | SYSTOLIC BLOOD PRESSURE: 133 MMHG | HEART RATE: 87 BPM | OXYGEN SATURATION: 98 % | WEIGHT: 147 LBS | BODY MASS INDEX: 27.75 KG/M2 | HEIGHT: 61 IN

## 2024-03-14 PROCEDURE — G2211 COMPLEX E/M VISIT ADD ON: CPT

## 2024-03-14 PROCEDURE — 99214 OFFICE O/P EST MOD 30 MIN: CPT

## 2024-03-14 NOTE — BEGINNING OF VISIT
[0] : 2) Feeling down, depressed, or hopeless: Not at all (0) [PHQ-2 Negative] : PHQ-2 Negative [de-identified] : 3/14/24 [[id number]] : 0

## 2024-03-14 NOTE — BEGINNING OF VISIT
[0] : 2) Feeling down, depressed, or hopeless: Not at all (0) [PHQ-2 Negative] : PHQ-2 Negative [de-identified] : 3/14/24 [[id number]] : 0

## 2024-03-14 NOTE — REVIEW OF SYSTEMS
[Diarrhea: Grade 0] : Diarrhea: Grade 0 [Hot Flashes] : hot flashes [Negative] : Allergic/Immunologic [de-identified] : RT skin changes, occasional left lateral breast/axilla pain and numbness

## 2024-03-14 NOTE — HISTORY OF PRESENT ILLNESS
[Disease: _____________________] : Disease: [unfilled] [T: ___] : T[unfilled] [N: ___] : N[unfilled] [M: ___] : M[unfilled] [AJCC Stage: ____] : AJCC Stage: [unfilled] [de-identified] : 57yo F with LEFT ILC (ER+/AL+/HER2-) s/p 12/19/23 left lumpectomy/SLNB and adjuvant RT (completed 3/5/24) returns today to discuss adjuvant endocrine therapy.  Fhx of breast cancer in paternal aunt (age unknown).  Hx of R benign US bx 6/28/13. She has hx of R excisional bx x2 in 12/29/2010 for FA 2:00 and FA w/ focal cellular stroma 4:00.  8/16/21: B/l MG & US (LHR)- heterogeneously dense. R stable bx clip 12-1:00. R stable postsurgical change LIQ. US- R 0.6cm stable hypoechoic structure bx benign 12:00 7FN. BI-RADS 2  8/18/22: B/l MG & US- heterogeneously dense. R bx clip. R postsurgical scar LIQ. US- B/l multiple complex cysts. BI-RADS 2  9/8/23: B/l MG & US- heterogeneously dense. R bx clip 11-12:00. US- R 0.5cm bc mass 12:00 8FN. L 0.5cm hypoechoic mass w/ irregular margins 11:00 4FN (was 0.3cm in 2022- rec us bx). BI-RADS 4  10/13/23: L US bx 0.5cm mass 11:00 4FN (heart clip)- ILC, pleomorphic type. ER+ (>90%), AL+ (>90%), HER2- (0 stain). Concordant  11/1/23: MRI- L 0.9cm irregular shaped mass w/ bx clip proven ILC 11:00. L 1cm clumped NME 2-3:00 (rec MR bx). R 0.9cm irregular mass 12-1:00 medial (rec MR bx). R 1.2cm region linear NME 12:00 anterior (rec MR bx). B/l no significant axillary or internal mammary lymphadenopathy. BI-RADS 4.  11/21/23: B/l MR bx x3 SITE 1) L NME 2-3:00 (hourglass clip)- Benign breast parenchyma w/ cystic apocrine metaplasia and UDH, microcalcs associated w/ epithelium. Benign & Concordant. SITE 2) R enhancing mass 12-1:00 (hourglass clip)- small focus LCIS (classic type), stromal fibrosis, UDH and apocrine metaplasia, microcalcs associated w/ LCIS and benign epithelium. High Risk & Concordant, there is a small focus, this may be an incidental lesion. Rec 6M f/u MRI if surgical excision is deferred. SITE 3) R NME 12:00 (buckle clip)- benign parenchyma w/ minute IDP, UDH and stromal fibrosis, microcalcs assoicated w/ epithelium. High risk due to presence of a papilloma; however papilloma is minute therefore rec 6M f/u.  12/19/23: L nloc lumpectomy & SNB- separate areas of ILC (pleomorphic type) ranging from microinvasive foci to largest present in 6 contiguous slices (24 mm), present at bx site changes HER2 negative (1+, 20%), LCIS, no lymphatic invasion identified, all final margins negative, 0/2 sentinel LN negative for tumor, 0/2 palpable LN negative for tumor.  2/14/24 DEXA - normal bone density  [de-identified] : poorly differentiated invasive lobular carcinoma [de-identified] : 11/2023 [facility] genetic testing negative [de-identified] : ER+ (>90%), MI+ (>90%), HER2- (0-1), Oncotype RS 12 [de-identified] : Tolerated RT well.  She has occasional 8/10 pain in the left lateral breast/axilla and numbness.  Uses emollients.  Has baseline hot flashes.  She has occasional joint pain in the hand and left hip when it's cold.

## 2024-03-14 NOTE — PHYSICAL EXAM
[de-identified] : s/p lumpectomy. No mass palpated b/l [Normal] : affect appropriate [de-identified] :  Normal range of active movement of the shoulder (180 for flexion and abduction).

## 2024-03-14 NOTE — HISTORY OF PRESENT ILLNESS
[Disease: _____________________] : Disease: [unfilled] [T: ___] : T[unfilled] [N: ___] : N[unfilled] [M: ___] : M[unfilled] [AJCC Stage: ____] : AJCC Stage: [unfilled] [de-identified] : poorly differentiated invasive lobular carcinoma [de-identified] : 57yo F with LEFT ILC (ER+/GA+/HER2-) s/p 12/19/23 left lumpectomy/SLNB and adjuvant RT (completed 3/5/24) returns today to discuss adjuvant endocrine therapy.  Fhx of breast cancer in paternal aunt (age unknown).  Hx of R benign US bx 6/28/13. She has hx of R excisional bx x2 in 12/29/2010 for FA 2:00 and FA w/ focal cellular stroma 4:00.  8/16/21: B/l MG & US (LHR)- heterogeneously dense. R stable bx clip 12-1:00. R stable postsurgical change LIQ. US- R 0.6cm stable hypoechoic structure bx benign 12:00 7FN. BI-RADS 2  8/18/22: B/l MG & US- heterogeneously dense. R bx clip. R postsurgical scar LIQ. US- B/l multiple complex cysts. BI-RADS 2  9/8/23: B/l MG & US- heterogeneously dense. R bx clip 11-12:00. US- R 0.5cm bc mass 12:00 8FN. L 0.5cm hypoechoic mass w/ irregular margins 11:00 4FN (was 0.3cm in 2022- rec us bx). BI-RADS 4  10/13/23: L US bx 0.5cm mass 11:00 4FN (heart clip)- ILC, pleomorphic type. ER+ (>90%), GA+ (>90%), HER2- (0 stain). Concordant  11/1/23: MRI- L 0.9cm irregular shaped mass w/ bx clip proven ILC 11:00. L 1cm clumped NME 2-3:00 (rec MR bx). R 0.9cm irregular mass 12-1:00 medial (rec MR bx). R 1.2cm region linear NME 12:00 anterior (rec MR bx). B/l no significant axillary or internal mammary lymphadenopathy. BI-RADS 4.  11/21/23: B/l MR bx x3 SITE 1) L NME 2-3:00 (hourglass clip)- Benign breast parenchyma w/ cystic apocrine metaplasia and UDH, microcalcs associated w/ epithelium. Benign & Concordant. SITE 2) R enhancing mass 12-1:00 (hourglass clip)- small focus LCIS (classic type), stromal fibrosis, UDH and apocrine metaplasia, microcalcs associated w/ LCIS and benign epithelium. High Risk & Concordant, there is a small focus, this may be an incidental lesion. Rec 6M f/u MRI if surgical excision is deferred. SITE 3) R NME 12:00 (buckle clip)- benign parenchyma w/ minute IDP, UDH and stromal fibrosis, microcalcs assoicated w/ epithelium. High risk due to presence of a papilloma; however papilloma is minute therefore rec 6M f/u.  12/19/23: L nloc lumpectomy & SNB- separate areas of ILC (pleomorphic type) ranging from microinvasive foci to largest present in 6 contiguous slices (24 mm), present at bx site changes HER2 negative (1+, 20%), LCIS, no lymphatic invasion identified, all final margins negative, 0/2 sentinel LN negative for tumor, 0/2 palpable LN negative for tumor.  2/14/24 DEXA - normal bone density  [de-identified] : 11/2023 [facility] genetic testing negative [de-identified] : ER+ (>90%), TX+ (>90%), HER2- (0-1), Oncotype RS 12 [de-identified] : Tolerated RT well.  She has occasional 8/10 pain in the left lateral breast/axilla and numbness.  Uses emollients.  Has baseline hot flashes.  She has occasional joint pain in the hand and left hip when it's cold.

## 2024-03-14 NOTE — REVIEW OF SYSTEMS
[Diarrhea: Grade 0] : Diarrhea: Grade 0 [Hot Flashes] : hot flashes [Negative] : Allergic/Immunologic [de-identified] : RT skin changes, occasional left lateral breast/axilla pain and numbness

## 2024-03-14 NOTE — PHYSICAL EXAM
[de-identified] : s/p lumpectomy. No mass palpated b/l [Normal] : affect appropriate [de-identified] :  Normal range of active movement of the shoulder (180 for flexion and abduction).

## 2024-05-07 ENCOUNTER — APPOINTMENT (OUTPATIENT)
Dept: BREAST CENTER | Facility: CLINIC | Age: 59
End: 2024-05-07

## 2024-05-09 ENCOUNTER — APPOINTMENT (OUTPATIENT)
Dept: RADIATION ONCOLOGY | Facility: CLINIC | Age: 59
End: 2024-05-09
Payer: COMMERCIAL

## 2024-05-09 VITALS
DIASTOLIC BLOOD PRESSURE: 87 MMHG | SYSTOLIC BLOOD PRESSURE: 127 MMHG | TEMPERATURE: 97.7 F | BODY MASS INDEX: 27.15 KG/M2 | WEIGHT: 143.7 LBS | HEART RATE: 82 BPM | RESPIRATION RATE: 16 BRPM | OXYGEN SATURATION: 99 %

## 2024-05-09 PROCEDURE — 99024 POSTOP FOLLOW-UP VISIT: CPT

## 2024-05-09 RX ORDER — MOMETASONE FUROATE 1 MG/G
0.1 OINTMENT TOPICAL TWICE DAILY
Qty: 1 | Refills: 1 | Status: COMPLETED | COMMUNITY
Start: 2024-01-16 | End: 2024-05-09

## 2024-05-09 NOTE — VITALS
[Maximal Pain Intensity: 0/10] : 0/10 [NoTreatment Scheduled] : no treatment scheduled [100: Normal, no complaints, no evidence of disease.] : 100: Normal, no complaints, no evidence of disease. [100: Fully active, normal.] : 100: Fully active, normal. [ECOG Performance Status: 0 - Fully active, able to carry on all pre-disease performance without restriction] : Performance Status: 0 - Fully active, able to carry on all pre-disease performance without restriction

## 2024-05-09 NOTE — PHYSICAL EXAM
[Normal] : oriented to person, place and time, the affect was normal, the mood was normal and not anxious [de-identified] : well healed lumpectomy incision and nipple incision

## 2024-05-09 NOTE — HISTORY OF PRESENT ILLNESS
[FreeTextEntry1] : Mouna Gandara is a 59 y/o F who completed 20/20F or 5240/5240cGy of radiation to the L breast on 3/5/2024 for LEFT breast ILC (ER+/AK+/HER2-). She had a normal bone density scan 2024. She was seen by Dr Obregon, Oncotype DX recurrence score 12, 3/14/2024 patient taking Anastrozole. She is to have MRI Breast Aug 2024. She will see Dr Tomlin 2024, Dr Obregon 2024.   2024 - PTE Today she comes without complaint, she states she is felling well. She denies fatigue, cp/palp, sob/kofi, f/c, pain. She endorses some numbness, tingling/sharp stabbing sensation occasionally, she does not take any meds - self resolves. Her skin is healed, still has some hyperpigmentation on Left medial breast. She states her issues with range of motion on her Left side has resolved. She is taking Anastrazole which she states she is having hot flashes and nausea.  __________________________________ ONCOLOGIC HISTORY: Mouna Gandara is a 57yo F who presents today for consideration of radiation therapy in the management of LEFT invasive lobular carcinoma (ER+/AK+/HER2-) found on annual imaging as 0.5cm mass 11:00 4FN (was described as stable 0.3cm complex cyst 2022). Patient referred by Dr. Ailyn Salazar. Patient was seen by Dr. Obregon 23 with plan for endocrine therapy. She has hx of R excisional bx x2 in 2010 for FA 2:00 and FA w/ focal cellular stroma 4:00. Fhx of breast cancer in paternal aunt (age unknown).  ----------------------------------- HPI is as follows:  21: B/l MG & US (LHR)- heterogeneously dense. R stable bx clip 12-1:00. R stable postsurgical change LIQ. US- R 0.6cm stable hypoechoic structure bx benign 12:00 7FN. BI-RADS 2 22: B/l MG & US- heterogeneously dense. R bx clip. R postsurgical scar LIQ. US- B/l multiple complex cysts. BI-RADS 2 23: B/l MG & US- heterogeneously dense. R bx clip 11-12:00. US- R 0.5cm bc mass 12:00 8FN. L 0.5cm hypoechoic mass w/ irregular margins 11:00 4FN (was 0.3cm in - rec us bx). BI-RADS 4 10/13/23: L US bx 0.5cm mass 11:00 4FN (heart clip)- ILC, pleomorphic type. ER+ (>90%), AK+ (>90%), HER2- (0 stain). Concordant- rec MRI 23: MRI- L 0.9cm irregular shaped mass w/ bx clip proven ILC 11:00. L 1cm clumped NME 2-3:00 (rec MR bx). R 0.9cm irregular mass 12-1:00 medial (rec MR bx). R 1.2cm region linear NME 12:00 anterior (rec MR bx). B/l no significant axillary or internal mammary lymphadenopathy. BI-RADS 4. 23: Pathology Synoptic Summary 1: Breast Invasive Carcinoma - Resection Specimen Procedure:  Excision (less than total mastectomy) Specimen Laterality:   Left Tumor Histologic Type:   Invasive lobular carcinoma Histologic Grade ([place] Histologic Score): Glandular (Acinar) / Tubular Differentiation:    Score 3 Nuclear Pleomorphism:   Score 3 Mitotic Rate:   Score 2 Overall Grade:   Grade 3 (scores of 8 or 9) Tumor Size:  24 Millimeters (mm) Ductal Carcinoma In Situ (DCIS):    Not identified Lymphatic and / or Vascular Invasion:    Not identified Treatment Effect in the Breast:   No known presurgical therapy Margins Margin Status for Invasive Carcinoma:    All margins negative for invasive carcinoma Distance from Invasive Carcinoma to Closest Margin:    Greater than 11 mm Regional Lymph Nodes Regional Lymph Node Status:   All regional lymph nodes negative for tumor Total Number of Lymph Nodes Examined (sentinel and non-sentinel): 8 Number of [place] Nodes Examined:    4 pTNM Classification (AJCC 8th Edition) pT Category:   pT2 T Suffix:  (m) pN Category:   pN0  Final Diagnosis 1  Left breast, lumpectomy: -   Separate areas of infiltrating lobular carcinoma, pleomorphic type, ranging from microinvasive foci to the largest present in 6 contiguous slices (24 mm), present at biopsy site changes.  See HER2 IHC report below -   Lobular carcinoma in situ. -   The closest margin is inferior and is 4 mm away (for final margin see part 5) E. -   No lymphatic invasion identified.  Oncotype RS 12   24 - RT Consult - Today patient presents for consultation accompanied by her daughter. Patient states she is feeling well overall but does note some stiffness to LUE, she is starting remote therapy to review exercises this thursday. 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    Prior RT - no  Prior Chemo - no Pacemaker - no

## 2024-05-16 ENCOUNTER — APPOINTMENT (OUTPATIENT)
Dept: BREAST CENTER | Facility: CLINIC | Age: 59
End: 2024-05-16
Payer: COMMERCIAL

## 2024-05-16 DIAGNOSIS — C50.919 MALIGNANT NEOPLASM OF UNSPECIFIED SITE OF UNSPECIFIED FEMALE BREAST: ICD-10-CM

## 2024-05-16 DIAGNOSIS — Z80.3 FAMILY HISTORY OF MALIGNANT NEOPLASM OF BREAST: ICD-10-CM

## 2024-05-16 DIAGNOSIS — Z08 ENCOUNTER FOR FOLLOW-UP EXAMINATION AFTER COMPLETED TREATMENT FOR MALIGNANT NEOPLASM: ICD-10-CM

## 2024-05-16 DIAGNOSIS — Z85.3 ENCOUNTER FOR FOLLOW-UP EXAMINATION AFTER COMPLETED TREATMENT FOR MALIGNANT NEOPLASM: ICD-10-CM

## 2024-05-16 PROCEDURE — 99213 OFFICE O/P EST LOW 20 MIN: CPT

## 2024-05-20 NOTE — PHYSICAL EXAM
[Normocephalic] : normocephalic [EOMI] : extra ocular movement intact [Supple] : supple [No Supraclavicular Adenopathy] : no supraclavicular adenopathy [No Cervical Adenopathy] : no cervical adenopathy [de-identified] : R breast/axilla/supraclavicular area: No masses, discharge, or adenopathy [de-identified] : L breast/axilla/supraclavicular area: No evidence of recurrence

## 2024-05-20 NOTE — HISTORY OF PRESENT ILLNESS
[FreeTextEntry1] : Patient is a 60yo F who presents today for breast cancer surveillance. S/P L nloc lumpectomy & SNB on 12/19/23 (age 58) yielding 2.4 cm ILC (ER+/NC+/HER2-), 0/4 LN, all final margins negative. Oncotype RS 12. S/p RT (completed 3/2024, Dr. Wernicke). Currently on Anastrozole (Dr. Obregon). She has hx of R excisional bx x2 in 12/29/2010 for FA 2:00 and FA w/ focal cellular stroma 4:00. Fhx of breast cancer in paternal aunt (age unknown). BRCA/full panel negative (tested 2023). Patient denies palpable masses, skin changes, or nipple discharge bilaterally.  TNM Staging-pT2, pN0, pM0  8/16/21: B/l MG & US ([facility])- heterogeneously dense. R stable bx clip 12-1:00. R stable postsurgical change LIQ. US- R 0.6cm stable hypoechoic structure bx benign 12:00 7FN. BI-RADS 2 8/18/22: B/l MG & US- heterogeneously dense. R bx clip. R postsurgical scar LIQ. US- B/l multiple complex cysts. BI-RADS 2 9/8/23: B/l MG & US- heterogeneously dense. R bx clip 11-12:00. US- R 0.5cm bc mass 12:00 8FN. L 0.5cm hypoechoic mass w/ irregular margins 11:00 4FN (was 0.3cm in 2022- rec us bx). BI-RADS 4 10/13/23: L US bx 0.5cm mass 11:00 4FN (heart clip)- ILC, pleomorphic type. ER+ (>90%), NC+ (>90%), HER2- (0 stain). Concordant- rec MRI 11/1/23: MRI- L 0.9cm irregular shaped mass w/ bx clip proven ILC 11:00. L 1cm clumped NME 2-3:00 (rec MR bx). R 0.9cm irregular mass 12-1:00 medial (rec MR bx). R 1.2cm region linear NME 12:00 anterior (rec MR bx). B/l no significant axillary or internal mammary lymphadenopathy. BI-RADS 4. 11/21/23: B/l MR bx x3 SITE 1) L NME 2-3:00 (hourglass clip)- Benign breast parenchyma w/ cystic apocrine metaplasia and UDH, microcalcs associated w/ epithelium. Benign & Concordant. SITE 2) R enhancing mass 12-1:00 (hourglass clip)- small focus LCIS (classic type), stromal fibrosis, UDH and apocrine metaplasia, microcalcs associated w/ LCIS and benign epithelium. High Risk & Concordant, there is a small focus, this may be an incidental lesion. Rec 6M f/u MRI if surgical excision is deferred. SITE 3) R NME 12:00 (buckle clip)- benign parenchyma w/ minute IDP, UDH and stromal fibrosis, microcalcs assoicated w/ epithelium. High risk due to presence of a papilloma; however papilloma is minute therefore rec 6M f/u. 12/19/23: L nloc lumpectomy & SNB- separate areas of ILC (pleomorphic type) ranging from microinvasive foci to largest present in 6 contiguous slices (24 mm), present at bx site changes HER2 negative (1+, 20%), LCIS, no lymphatic invasion identified, all final margins negative, 0/2 sentinel LN negative for tumor, 0/2 palpable LN negative for tumor.  5/16/24: MRI: heterogeneously dense. Stable R enhancing foci. L numerous enhancing foci with postlumpectomy changes and radiation changes. L 1.2cm 11:00 postop fat necrosis inferior to lumpx site. (rec 6 m f/u MRI). BI-RADS 3.

## 2024-05-29 ENCOUNTER — TRANSCRIPTION ENCOUNTER (OUTPATIENT)
Age: 59
End: 2024-05-29

## 2024-05-29 RX ORDER — ANASTROZOLE TABLETS 1 MG/1
1 TABLET ORAL DAILY
Qty: 90 | Refills: 0 | Status: ACTIVE | COMMUNITY
Start: 2024-03-14 | End: 1900-01-01

## 2024-05-30 ENCOUNTER — TRANSCRIPTION ENCOUNTER (OUTPATIENT)
Age: 59
End: 2024-05-30

## 2024-09-12 ENCOUNTER — APPOINTMENT (OUTPATIENT)
Dept: HEMATOLOGY ONCOLOGY | Facility: CLINIC | Age: 59
End: 2024-09-12
Payer: COMMERCIAL

## 2024-09-12 VITALS
SYSTOLIC BLOOD PRESSURE: 123 MMHG | DIASTOLIC BLOOD PRESSURE: 83 MMHG | HEIGHT: 61 IN | HEART RATE: 74 BPM | BODY MASS INDEX: 26.24 KG/M2 | WEIGHT: 139 LBS | OXYGEN SATURATION: 99 % | RESPIRATION RATE: 18 BRPM

## 2024-09-12 DIAGNOSIS — C50.919 MALIGNANT NEOPLASM OF UNSPECIFIED SITE OF UNSPECIFIED FEMALE BREAST: ICD-10-CM

## 2024-09-12 PROCEDURE — 99214 OFFICE O/P EST MOD 30 MIN: CPT

## 2024-09-13 RX ORDER — CALCIUM CITRATE/VITAMIN D3 315MG-6.25
TABLET ORAL
Refills: 0 | Status: ACTIVE | COMMUNITY

## 2024-09-13 NOTE — PHYSICAL EXAM
[Normal] : affect appropriate [de-identified] : s/p lumpectomy. No mass palpated in the breast or axillae [de-identified] :  Normal range of active movement of the shoulder (180 for flexion and abduction).

## 2024-09-13 NOTE — PHYSICAL EXAM
[Normal] : affect appropriate [de-identified] : s/p lumpectomy. No mass palpated in the breast or axillae [de-identified] :  Normal range of active movement of the shoulder (180 for flexion and abduction).

## 2024-09-13 NOTE — REVIEW OF SYSTEMS
[Hot Flashes] : hot flashes [Diarrhea: Grade 0] : Diarrhea: Grade 0 [Negative] : Allergic/Immunologic [Recent Change In Weight] : ~T recent weight change [FreeTextEntry7] : transient nausea, constipation relieved by prunes [de-identified] : RT skin changes, occasional left lateral breast/axilla pain and numbness

## 2024-09-13 NOTE — REVIEW OF SYSTEMS
[Hot Flashes] : hot flashes [Diarrhea: Grade 0] : Diarrhea: Grade 0 [Negative] : Allergic/Immunologic [Recent Change In Weight] : ~T recent weight change [FreeTextEntry7] : transient nausea, constipation relieved by prunes [de-identified] : RT skin changes, occasional left lateral breast/axilla pain and numbness

## 2024-09-13 NOTE — PHYSICAL EXAM
[Normal] : affect appropriate [de-identified] : s/p lumpectomy. No mass palpated in the breast or axillae [de-identified] :  Normal range of active movement of the shoulder (180 for flexion and abduction).

## 2024-09-13 NOTE — HISTORY OF PRESENT ILLNESS
[Disease: _____________________] : Disease: [unfilled] [T: ___] : T[unfilled] [N: ___] : N[unfilled] [M: ___] : M[unfilled] [AJCC Stage: ____] : AJCC Stage: [unfilled] [de-identified] : 58yo F with Stage IB T2N0Mx LEFT ILC (ER+/NV+/HER2-) s/p 12/19/23 left lumpectomy/SLNB, adjuvant RT (completed 3/5/24), and adjuvant anastrozole (3/2024-present) is here for f/u.  Fhx of breast cancer in paternal aunt (age unknown).  Hx of R benign US bx 6/28/13. She has hx of R excisional bx x2 in 12/29/2010 for FA 2:00 and FA w/ focal cellular stroma 4:00.  8/16/21: B/l MG & US ([facility])- heterogeneously dense. R stable bx clip 12-1:00. R stable postsurgical change LIQ. US- R 0.6cm stable hypoechoic structure bx benign 12:00 7FN. BI-RADS 2  8/18/22: B/l MG & US- heterogeneously dense. R bx clip. R postsurgical scar LIQ. US- B/l multiple complex cysts. BI-RADS 2  9/8/23: B/l MG & US- heterogeneously dense. R bx clip 11-12:00. US- R 0.5cm bc mass 12:00 8FN. L 0.5cm hypoechoic mass w/ irregular margins 11:00 4FN (was 0.3cm in 2022- rec us bx). BI-RADS 4  10/13/23: L US bx 0.5cm mass 11:00 4FN (heart clip)- ILC, pleomorphic type. ER+ (>90%), NV+ (>90%), HER2- (0 stain). Concordant  11/1/23: MRI- L 0.9cm irregular shaped mass w/ bx clip proven ILC 11:00. L 1cm clumped NME 2-3:00 (rec MR bx). R 0.9cm irregular mass 12-1:00 medial (rec MR bx). R 1.2cm region linear NME 12:00 anterior (rec MR bx). B/l no significant axillary or internal mammary lymphadenopathy. BI-RADS 4.  11/21/23: B/l MR bx x3 SITE 1) L NME 2-3:00 (hourglass clip)- Benign breast parenchyma w/ cystic apocrine metaplasia and UDH, microcalcs associated w/ epithelium. Benign & Concordant. SITE 2) R enhancing mass 12-1:00 (hourglass clip)- small focus LCIS (classic type), stromal fibrosis, UDH and apocrine metaplasia, microcalcs associated w/ LCIS and benign epithelium. High Risk & Concordant, there is a small focus, this may be an incidental lesion. Rec 6M f/u MRI if surgical excision is deferred. SITE 3) R NME 12:00 (buckle clip)- benign parenchyma w/ minute IDP, UDH and stromal fibrosis, microcalcs assoicated w/ epithelium. High risk due to presence of a papilloma; however papilloma is minute therefore rec 6M f/u.  12/19/23: L nloc lumpectomy & SNB- separate areas of ILC (pleomorphic type) ranging from microinvasive foci to largest present in 6 contiguous slices (24 mm), present at bx site changes HER2 negative (1+, 20%), LCIS, no lymphatic invasion identified, all final margins negative, 0/2 sentinel LN negative for tumor, 0/2 palpable LN negative for tumor.  2/14/24 DEXA - normal bone density   5/16/24 MRI breast: Postlumpectomy and radiation therapy changes on the left. Possible postoperative enhancing fat necrosis just inferior to the lumpectomy site, in the left breast 11:00, at the level the nipple. There are foci of background enhancement bilaterally, a follow-up bilateral study is recommended in 6 months.   [de-identified] : poorly differentiated invasive lobular carcinoma [de-identified] : ER+ (>90%), DE+ (>90%), HER2- (0-1), Oncotype RS 12 [de-identified] : 11/2023 [facility] genetic testing negative [de-identified] : She is taking anastrozole, calcium, Vitamin D daily and walks 30 mins a day for exercise.  She reports hot flashes are more intense, but are sporadic.  She does not want to take medications for hot flashes.  She reports waves of nausea since starting anastrozole, getting better, lasts 1-2 mins, not triggered by specific foods.  The nausea causes decreased appetite and resulting weight loss.  She reports the eats three meals a day, small portions.  She was also severely constipated in the beginning, tried various remedies, now has her regular BMs several times a day by eating prunes.  She has occasional, transient joint aches in the left hip, left shoulder, feet.  Under the left ribs she feels tenderness, has occasional numbness in the left breast.  Shoulder ROM improved with therapy.   She is scheduled for MG/US/MRI and follow up with Loretta Molina NP on 11/7/24.  PCP check up 6/2024, labs done, WNL, except Cholesterol 217  GYN check up 2023, next check up in 2026 Eye exam 2024 UTD, no cataracts or glaucoma Dental exam every 6 months Yearly stool tests for colon cancer screening, due this year.

## 2024-09-13 NOTE — HISTORY OF PRESENT ILLNESS
[Disease: _____________________] : Disease: [unfilled] [T: ___] : T[unfilled] [N: ___] : N[unfilled] [M: ___] : M[unfilled] [AJCC Stage: ____] : AJCC Stage: [unfilled] [de-identified] : 58yo F with Stage IB T2N0Mx LEFT ILC (ER+/SC+/HER2-) s/p 12/19/23 left lumpectomy/SLNB, adjuvant RT (completed 3/5/24), and adjuvant anastrozole (3/2024-present) is here for f/u.  Fhx of breast cancer in paternal aunt (age unknown).  Hx of R benign US bx 6/28/13. She has hx of R excisional bx x2 in 12/29/2010 for FA 2:00 and FA w/ focal cellular stroma 4:00.  8/16/21: B/l MG & US ([facility])- heterogeneously dense. R stable bx clip 12-1:00. R stable postsurgical change LIQ. US- R 0.6cm stable hypoechoic structure bx benign 12:00 7FN. BI-RADS 2  8/18/22: B/l MG & US- heterogeneously dense. R bx clip. R postsurgical scar LIQ. US- B/l multiple complex cysts. BI-RADS 2  9/8/23: B/l MG & US- heterogeneously dense. R bx clip 11-12:00. US- R 0.5cm bc mass 12:00 8FN. L 0.5cm hypoechoic mass w/ irregular margins 11:00 4FN (was 0.3cm in 2022- rec us bx). BI-RADS 4  10/13/23: L US bx 0.5cm mass 11:00 4FN (heart clip)- ILC, pleomorphic type. ER+ (>90%), SC+ (>90%), HER2- (0 stain). Concordant  11/1/23: MRI- L 0.9cm irregular shaped mass w/ bx clip proven ILC 11:00. L 1cm clumped NME 2-3:00 (rec MR bx). R 0.9cm irregular mass 12-1:00 medial (rec MR bx). R 1.2cm region linear NME 12:00 anterior (rec MR bx). B/l no significant axillary or internal mammary lymphadenopathy. BI-RADS 4.  11/21/23: B/l MR bx x3 SITE 1) L NME 2-3:00 (hourglass clip)- Benign breast parenchyma w/ cystic apocrine metaplasia and UDH, microcalcs associated w/ epithelium. Benign & Concordant. SITE 2) R enhancing mass 12-1:00 (hourglass clip)- small focus LCIS (classic type), stromal fibrosis, UDH and apocrine metaplasia, microcalcs associated w/ LCIS and benign epithelium. High Risk & Concordant, there is a small focus, this may be an incidental lesion. Rec 6M f/u MRI if surgical excision is deferred. SITE 3) R NME 12:00 (buckle clip)- benign parenchyma w/ minute IDP, UDH and stromal fibrosis, microcalcs assoicated w/ epithelium. High risk due to presence of a papilloma; however papilloma is minute therefore rec 6M f/u.  12/19/23: L nloc lumpectomy & SNB- separate areas of ILC (pleomorphic type) ranging from microinvasive foci to largest present in 6 contiguous slices (24 mm), present at bx site changes HER2 negative (1+, 20%), LCIS, no lymphatic invasion identified, all final margins negative, 0/2 sentinel LN negative for tumor, 0/2 palpable LN negative for tumor.  2/14/24 DEXA - normal bone density   5/16/24 MRI breast: Postlumpectomy and radiation therapy changes on the left. Possible postoperative enhancing fat necrosis just inferior to the lumpectomy site, in the left breast 11:00, at the level the nipple. There are foci of background enhancement bilaterally, a follow-up bilateral study is recommended in 6 months.   [de-identified] : poorly differentiated invasive lobular carcinoma [de-identified] : ER+ (>90%), VA+ (>90%), HER2- (0-1), Oncotype RS 12 [de-identified] : 11/2023 [facility] genetic testing negative [de-identified] : She is taking anastrozole, calcium, Vitamin D daily and walks 30 mins a day for exercise.  She reports hot flashes are more intense, but are sporadic.  She does not want to take medications for hot flashes.  She reports waves of nausea since starting anastrozole, getting better, lasts 1-2 mins, not triggered by specific foods.  The nausea causes decreased appetite and resulting weight loss.  She reports the eats three meals a day, small portions.  She was also severely constipated in the beginning, tried various remedies, now has her regular BMs several times a day by eating prunes.  She has occasional, transient joint aches in the left hip, left shoulder, feet.  Under the left ribs she feels tenderness, has occasional numbness in the left breast.  Shoulder ROM improved with therapy.   She is scheduled for MG/US/MRI and follow up with Loretta Molina NP on 11/7/24.  PCP check up 6/2024, labs done, WNL, except Cholesterol 217  GYN check up 2023, next check up in 2026 Eye exam 2024 UTD, no cataracts or glaucoma Dental exam every 6 months Yearly stool tests for colon cancer screening, due this year.

## 2024-09-13 NOTE — REVIEW OF SYSTEMS
[Diarrhea: Grade 0] : Diarrhea: Grade 0 [Hot Flashes] : hot flashes [Negative] : Allergic/Immunologic [Recent Change In Weight] : ~T recent weight change [FreeTextEntry7] : transient nausea, constipation relieved by prunes [de-identified] : RT skin changes, occasional left lateral breast/axilla pain and numbness

## 2024-11-07 ENCOUNTER — NON-APPOINTMENT (OUTPATIENT)
Age: 59
End: 2024-11-07

## 2024-11-07 ENCOUNTER — APPOINTMENT (OUTPATIENT)
Dept: BREAST CENTER | Facility: CLINIC | Age: 59
End: 2024-11-07
Payer: COMMERCIAL

## 2024-11-07 ENCOUNTER — APPOINTMENT (OUTPATIENT)
Dept: BREAST CENTER | Facility: CLINIC | Age: 59
End: 2024-11-07

## 2024-11-07 VITALS
HEIGHT: 61 IN | BODY MASS INDEX: 26.06 KG/M2 | HEART RATE: 64 BPM | SYSTOLIC BLOOD PRESSURE: 136 MMHG | DIASTOLIC BLOOD PRESSURE: 88 MMHG | WEIGHT: 138 LBS

## 2024-11-07 DIAGNOSIS — C50.919 MALIGNANT NEOPLASM OF UNSPECIFIED SITE OF UNSPECIFIED FEMALE BREAST: ICD-10-CM

## 2024-11-07 DIAGNOSIS — Z85.3 ENCOUNTER FOR FOLLOW-UP EXAMINATION AFTER COMPLETED TREATMENT FOR MALIGNANT NEOPLASM: ICD-10-CM

## 2024-11-07 DIAGNOSIS — Z08 ENCOUNTER FOR FOLLOW-UP EXAMINATION AFTER COMPLETED TREATMENT FOR MALIGNANT NEOPLASM: ICD-10-CM

## 2024-11-07 DIAGNOSIS — R92.8 OTHER ABNORMAL AND INCONCLUSIVE FINDINGS ON DIAGNOSTIC IMAGING OF BREAST: ICD-10-CM

## 2024-11-07 PROCEDURE — 99214 OFFICE O/P EST MOD 30 MIN: CPT

## 2024-11-08 ENCOUNTER — APPOINTMENT (OUTPATIENT)
Dept: BREAST CENTER | Facility: CLINIC | Age: 59
End: 2024-11-08

## 2024-11-21 ENCOUNTER — RESULT REVIEW (OUTPATIENT)
Age: 59
End: 2024-11-21

## 2024-11-26 ENCOUNTER — TRANSCRIPTION ENCOUNTER (OUTPATIENT)
Age: 59
End: 2024-11-26

## 2024-11-26 ENCOUNTER — NON-APPOINTMENT (OUTPATIENT)
Age: 59
End: 2024-11-26

## 2024-11-26 DIAGNOSIS — R92.30 DENSE BREASTS, UNSPECIFIED: ICD-10-CM

## 2025-03-13 ENCOUNTER — APPOINTMENT (OUTPATIENT)
Dept: HEMATOLOGY ONCOLOGY | Facility: CLINIC | Age: 60
End: 2025-03-13
Payer: COMMERCIAL

## 2025-03-13 ENCOUNTER — NON-APPOINTMENT (OUTPATIENT)
Age: 60
End: 2025-03-13

## 2025-03-13 VITALS
HEIGHT: 61 IN | SYSTOLIC BLOOD PRESSURE: 133 MMHG | HEART RATE: 72 BPM | WEIGHT: 137 LBS | BODY MASS INDEX: 25.86 KG/M2 | OXYGEN SATURATION: 99 % | DIASTOLIC BLOOD PRESSURE: 89 MMHG | RESPIRATION RATE: 18 BRPM

## 2025-03-13 DIAGNOSIS — C50.919 MALIGNANT NEOPLASM OF UNSPECIFIED SITE OF UNSPECIFIED FEMALE BREAST: ICD-10-CM

## 2025-03-13 PROCEDURE — 99215 OFFICE O/P EST HI 40 MIN: CPT

## 2025-05-19 ENCOUNTER — APPOINTMENT (OUTPATIENT)
Dept: BREAST CENTER | Facility: CLINIC | Age: 60
End: 2025-05-19
Payer: COMMERCIAL

## 2025-05-19 VITALS
SYSTOLIC BLOOD PRESSURE: 131 MMHG | HEART RATE: 71 BPM | BODY MASS INDEX: 24.22 KG/M2 | DIASTOLIC BLOOD PRESSURE: 84 MMHG | HEIGHT: 63.27 IN | WEIGHT: 138.4 LBS

## 2025-05-19 DIAGNOSIS — Z85.3 ENCOUNTER FOR FOLLOW-UP EXAMINATION AFTER COMPLETED TREATMENT FOR MALIGNANT NEOPLASM: ICD-10-CM

## 2025-05-19 DIAGNOSIS — Z87.898 PERSONAL HISTORY OF OTHER SPECIFIED CONDITIONS: ICD-10-CM

## 2025-05-19 DIAGNOSIS — R92.30 DENSE BREASTS, UNSPECIFIED: ICD-10-CM

## 2025-05-19 DIAGNOSIS — Z08 ENCOUNTER FOR FOLLOW-UP EXAMINATION AFTER COMPLETED TREATMENT FOR MALIGNANT NEOPLASM: ICD-10-CM

## 2025-05-19 PROCEDURE — 93702 BIS XTRACELL FLUID ANALYSIS: CPT | Mod: NC

## 2025-05-19 PROCEDURE — 99213 OFFICE O/P EST LOW 20 MIN: CPT

## 2025-09-17 PROBLEM — Z13.820 OSTEOPOROSIS SCREENING: Status: ACTIVE | Noted: 2025-09-17

## 2025-09-17 PROBLEM — Z79.811 AROMATASE INHIBITOR USE: Status: ACTIVE | Noted: 2025-09-17

## 2025-09-18 ENCOUNTER — APPOINTMENT (OUTPATIENT)
Dept: HEMATOLOGY ONCOLOGY | Facility: CLINIC | Age: 60
End: 2025-09-18
Payer: COMMERCIAL

## 2025-09-18 VITALS
OXYGEN SATURATION: 97 % | HEART RATE: 83 BPM | SYSTOLIC BLOOD PRESSURE: 116 MMHG | WEIGHT: 137.25 LBS | RESPIRATION RATE: 18 BRPM | BODY MASS INDEX: 24.02 KG/M2 | DIASTOLIC BLOOD PRESSURE: 85 MMHG | TEMPERATURE: 98.7 F | HEIGHT: 63.27 IN

## 2025-09-18 DIAGNOSIS — Z13.820 ENCOUNTER FOR SCREENING FOR OSTEOPOROSIS: ICD-10-CM

## 2025-09-18 DIAGNOSIS — Z79.811 LONG TERM (CURRENT) USE OF AROMATASE INHIBITORS: ICD-10-CM

## 2025-09-18 DIAGNOSIS — C50.919 MALIGNANT NEOPLASM OF UNSPECIFIED SITE OF UNSPECIFIED FEMALE BREAST: ICD-10-CM

## 2025-09-18 PROCEDURE — 99214 OFFICE O/P EST MOD 30 MIN: CPT
